# Patient Record
Sex: FEMALE | Race: WHITE | URBAN - METROPOLITAN AREA
[De-identification: names, ages, dates, MRNs, and addresses within clinical notes are randomized per-mention and may not be internally consistent; named-entity substitution may affect disease eponyms.]

---

## 2019-12-11 ENCOUNTER — OFFICE VISIT (OUTPATIENT)
Dept: FAMILY MEDICINE CLINIC | Age: 69
End: 2019-12-11
Payer: MEDICARE

## 2019-12-11 VITALS
HEART RATE: 59 BPM | TEMPERATURE: 98.1 F | OXYGEN SATURATION: 96 % | HEIGHT: 66 IN | BODY MASS INDEX: 29.57 KG/M2 | DIASTOLIC BLOOD PRESSURE: 76 MMHG | RESPIRATION RATE: 16 BRPM | SYSTOLIC BLOOD PRESSURE: 118 MMHG | WEIGHT: 184 LBS

## 2019-12-11 DIAGNOSIS — S61.217A LACERATION OF LEFT LITTLE FINGER WITHOUT FOREIGN BODY WITHOUT DAMAGE TO NAIL, INITIAL ENCOUNTER: Primary | ICD-10-CM

## 2019-12-11 PROCEDURE — 99214 OFFICE O/P EST MOD 30 MIN: CPT | Performed by: PHYSICIAN ASSISTANT

## 2019-12-11 PROCEDURE — G8484 FLU IMMUNIZE NO ADMIN: HCPCS | Performed by: PHYSICIAN ASSISTANT

## 2019-12-11 PROCEDURE — G8400 PT W/DXA NO RESULTS DOC: HCPCS | Performed by: PHYSICIAN ASSISTANT

## 2019-12-11 PROCEDURE — G8419 CALC BMI OUT NRM PARAM NOF/U: HCPCS | Performed by: PHYSICIAN ASSISTANT

## 2019-12-11 PROCEDURE — 12002 RPR S/N/AX/GEN/TRNK2.6-7.5CM: CPT | Performed by: PHYSICIAN ASSISTANT

## 2019-12-11 PROCEDURE — 3017F COLORECTAL CA SCREEN DOC REV: CPT | Performed by: PHYSICIAN ASSISTANT

## 2019-12-11 PROCEDURE — 1036F TOBACCO NON-USER: CPT | Performed by: PHYSICIAN ASSISTANT

## 2019-12-11 PROCEDURE — G8427 DOCREV CUR MEDS BY ELIG CLIN: HCPCS | Performed by: PHYSICIAN ASSISTANT

## 2019-12-11 PROCEDURE — 1123F ACP DISCUSS/DSCN MKR DOCD: CPT | Performed by: PHYSICIAN ASSISTANT

## 2019-12-11 PROCEDURE — 1090F PRES/ABSN URINE INCON ASSESS: CPT | Performed by: PHYSICIAN ASSISTANT

## 2019-12-11 PROCEDURE — 29130 APPL FINGER SPLINT STATIC: CPT | Performed by: PHYSICIAN ASSISTANT

## 2019-12-11 PROCEDURE — 4040F PNEUMOC VAC/ADMIN/RCVD: CPT | Performed by: PHYSICIAN ASSISTANT

## 2019-12-11 RX ORDER — VENLAFAXINE 75 MG/1
75 TABLET ORAL DAILY
COMMUNITY

## 2019-12-11 RX ORDER — SITAGLIPTIN AND METFORMIN HYDROCHLORIDE 1000; 50 MG/1; MG/1
TABLET, FILM COATED ORAL
COMMUNITY
Start: 2019-11-11

## 2019-12-11 RX ORDER — ATORVASTATIN CALCIUM 20 MG/1
TABLET, FILM COATED ORAL
COMMUNITY
Start: 2019-11-11

## 2019-12-11 RX ORDER — INSULIN DEGLUDEC 200 U/ML
INJECTION, SOLUTION SUBCUTANEOUS
Refills: 2 | COMMUNITY
Start: 2019-10-31

## 2019-12-11 RX ORDER — PROPAFENONE HYDROCHLORIDE 150 MG/1
150 TABLET, FILM COATED ORAL EVERY 8 HOURS
COMMUNITY

## 2019-12-11 RX ORDER — AMLODIPINE BESYLATE 10 MG/1
10 TABLET ORAL DAILY
COMMUNITY

## 2019-12-11 RX ORDER — ATENOLOL 50 MG/1
TABLET ORAL
COMMUNITY
Start: 2019-11-11

## 2019-12-11 RX ORDER — INSULIN ASPART 100 [IU]/ML
INJECTION, SOLUTION INTRAVENOUS; SUBCUTANEOUS
Refills: 0 | COMMUNITY
Start: 2019-11-12

## 2019-12-11 SDOH — HEALTH STABILITY: MENTAL HEALTH: HOW OFTEN DO YOU HAVE A DRINK CONTAINING ALCOHOL?: NEVER

## 2019-12-20 ENCOUNTER — OFFICE VISIT (OUTPATIENT)
Dept: FAMILY MEDICINE CLINIC | Age: 69
End: 2019-12-20
Payer: MEDICARE

## 2019-12-20 VITALS
HEIGHT: 66 IN | WEIGHT: 185 LBS | OXYGEN SATURATION: 97 % | SYSTOLIC BLOOD PRESSURE: 138 MMHG | RESPIRATION RATE: 16 BRPM | HEART RATE: 64 BPM | DIASTOLIC BLOOD PRESSURE: 68 MMHG | BODY MASS INDEX: 29.73 KG/M2 | TEMPERATURE: 97.8 F

## 2019-12-20 DIAGNOSIS — Z48.02 ENCOUNTER FOR REMOVAL OF SUTURES: Primary | ICD-10-CM

## 2019-12-20 DIAGNOSIS — S61.217A LACERATION OF LEFT LITTLE FINGER WITHOUT FOREIGN BODY WITHOUT DAMAGE TO NAIL, INITIAL ENCOUNTER: ICD-10-CM

## 2019-12-20 PROCEDURE — 1090F PRES/ABSN URINE INCON ASSESS: CPT | Performed by: PHYSICIAN ASSISTANT

## 2019-12-20 PROCEDURE — G8400 PT W/DXA NO RESULTS DOC: HCPCS | Performed by: PHYSICIAN ASSISTANT

## 2019-12-20 PROCEDURE — 1036F TOBACCO NON-USER: CPT | Performed by: PHYSICIAN ASSISTANT

## 2019-12-20 PROCEDURE — G8484 FLU IMMUNIZE NO ADMIN: HCPCS | Performed by: PHYSICIAN ASSISTANT

## 2019-12-20 PROCEDURE — 1123F ACP DISCUSS/DSCN MKR DOCD: CPT | Performed by: PHYSICIAN ASSISTANT

## 2019-12-20 PROCEDURE — 99213 OFFICE O/P EST LOW 20 MIN: CPT | Performed by: PHYSICIAN ASSISTANT

## 2019-12-20 PROCEDURE — G8427 DOCREV CUR MEDS BY ELIG CLIN: HCPCS | Performed by: PHYSICIAN ASSISTANT

## 2019-12-20 PROCEDURE — G8417 CALC BMI ABV UP PARAM F/U: HCPCS | Performed by: PHYSICIAN ASSISTANT

## 2019-12-20 PROCEDURE — 3017F COLORECTAL CA SCREEN DOC REV: CPT | Performed by: PHYSICIAN ASSISTANT

## 2019-12-20 PROCEDURE — 4040F PNEUMOC VAC/ADMIN/RCVD: CPT | Performed by: PHYSICIAN ASSISTANT

## 2022-11-09 ENCOUNTER — HOSPITAL ENCOUNTER (OUTPATIENT)
Dept: DIABETES SERVICES | Age: 72
Setting detail: THERAPIES SERIES
Discharge: HOME | End: 2022-11-09
Payer: MEDICARE

## 2022-11-09 PROCEDURE — 97804 MEDICAL NUTRITION GROUP: CPT

## 2022-11-09 NOTE — PROGRESS NOTES
MNT Note for Diabetes Education    Date: 11/9/2022  Patient Name: Tasha Hamilton  Referring Clinician: Chantelle Villalta MD    Reason for Visit:   History of attending DSMES: [] Yes  [x] No  Date:  History of MNT: [] Yes  [x] No   Date:    NUTRITION ASSESSMENT:      Sex: female    Age: 71 y.o. Height: 66 inches    CBW: 186 lbs  BMI: 29.9        Current diet  Diet Recall:  B: cereal, milk, fruit  S:  L: sandwich, apple  S:  D: protein, potato   S: fruit     Her diet contains adequate amounts of protein, adequate amounts of healthy fats, inadequate amounts of green, leafy vegetables, and adequate amounts of fruits. Eating food from outside the home:  1 times per week    Weight History:  []Increased in past yr   []Decreased in past yr   [x]Stable    Appetite:  normal    Digestive concerns:  None    Fluid Intake: >64 oz/day  States they drink mainly: water, diet soda    Medications:  Prior to Admission medications    Medication Sig Start Date End Date Taking? Authorizing Provider   rivaroxaban (XARELTO) 10 MG TABS tablet Take 10 mg by mouth daily (with breakfast)    Historical Provider, MD   atenolol (TENORMIN) 50 MG tablet  11/11/19   Historical Provider, MD   atorvastatin (LIPITOR) 20 MG tablet  11/11/19   Historical Provider, MD   JANUMEFRANKO  MG per tablet  11/11/19   Historical Provider, MD WIN FLEXTOUCH 200 UNIT/ML SOPN INJECT 24 UNITS EVERY DAY SUBCUTANEOUS 10/31/19   Historical Provider, MD   NOVOLOG FLEXPEN 100 UNIT/ML injection pen INJECT 15 UNITS INTO THE SKIN 3 TIMES A DAY 11/12/19   Historical Provider, MD   propafenone (RYTHMOL) 150 MG tablet Take 150 mg by mouth every 8 hours    Historical Provider, MD   venlafaxine (EFFEXOR) 75 MG tablet Take 75 mg by mouth daily    Historical Provider, MD   amLODIPine (NORVASC) 10 MG tablet Take 10 mg by mouth daily    Historical Provider, MD             What was your last A1C result?  9.3 %  (4/27/22)     A1C goal:<7.0%  Other:     Diabetes Understanding self-assessment:        [x] Good        [] Fair        [] Poor    Self-Monitoring Blood Glucose: [x] Yes - CGM      Motivational Scale:  10     Motivators for lifestyle change:   Grandson    Barriers:    none    Work:  []Full time  []Part time  []In school  [x]Retired  []Unemployed   []Day shift  []Night Shift  []Other:    Financial Concerns:        []Yes -          [x]No    Do you have a Support person in your life? [x]Yes-  - Julian   []No     Current Exercise Pattern:   []Yes   [x]No    Physical Exam:  Overall appearance: Overweight  Comment:  Behavioral risks: None identified    NUTRITION DIAGNOSIS:  Inconsistent carbohydrate intake  RELATED TO lack of prior expose to accurate nutrition related information  AS EVIDENCED BY elevated A1C, report of excess carbohydrate intake at one large meal or snack. INTERVENTIONS AND GOALS:  consistent carbohydrate intake at meals and snacks( eat meals 4 1/2 to 5 hours apart. Eat snacks 2 hours after meals. Keep carbohydrate choices to 3 ( 45 grams) at each meals with 1 carbohydrate choices (15 grams)  for snacks        Implementation/Follow-up plan: Patient will call with any questions or concerns . This RD will follow up with the patient to monitor progress and evaluate outcomes. Follow-up date:    Follow-up notes:       Encounter Type Date Attended Start Time End Time Comments No Show Dates   Initial MNT 11/9/22 CS 1310 1500    Sung Encarnacion    Follow-up MNT                 Electronically signed by: Lilia Dailey RD, SAMANTHA on 11/9/22 at 3:40 PM EST

## 2023-08-11 ENCOUNTER — HOSPITAL ENCOUNTER (OUTPATIENT)
Age: 73
Discharge: HOME | End: 2023-08-11
Payer: MEDICARE

## 2023-08-11 LAB
BUN SERPL-MCNC: 9 MG/DL (ref 6–23)
CREAT SERPL-MCNC: 0.7 MG/DL (ref 0.5–1)
GFR SERPL CREATININE-BSD FRML MDRD: >60 ML/MIN/1.73M2

## 2023-08-11 PROCEDURE — 36415 COLL VENOUS BLD VENIPUNCTURE: CPT

## 2023-08-11 PROCEDURE — 84520 ASSAY OF UREA NITROGEN: CPT

## 2023-08-11 PROCEDURE — 82565 ASSAY OF CREATININE: CPT

## 2024-07-31 ENCOUNTER — HOSPITAL ENCOUNTER (EMERGENCY)
Age: 74
Discharge: HOME | End: 2024-07-31
Attending: EMERGENCY MEDICINE
Payer: MEDICARE

## 2024-07-31 VITALS
OXYGEN SATURATION: 96 % | HEIGHT: 66 IN | SYSTOLIC BLOOD PRESSURE: 158 MMHG | DIASTOLIC BLOOD PRESSURE: 78 MMHG | RESPIRATION RATE: 16 BRPM | BODY MASS INDEX: 29.73 KG/M2 | TEMPERATURE: 98.3 F | HEART RATE: 88 BPM | WEIGHT: 185 LBS

## 2024-07-31 DIAGNOSIS — Z23 NEED FOR TETANUS BOOSTER: ICD-10-CM

## 2024-07-31 DIAGNOSIS — S05.01XA ABRASION OF RIGHT CORNEA, INITIAL ENCOUNTER: Primary | ICD-10-CM

## 2024-07-31 PROCEDURE — 90471 IMMUNIZATION ADMIN: CPT | Performed by: NURSE PRACTITIONER

## 2024-07-31 PROCEDURE — 6370000000 HC RX 637 (ALT 250 FOR IP): Performed by: NURSE PRACTITIONER

## 2024-07-31 PROCEDURE — 99284 EMERGENCY DEPT VISIT MOD MDM: CPT

## 2024-07-31 PROCEDURE — 6360000002 HC RX W HCPCS: Performed by: NURSE PRACTITIONER

## 2024-07-31 PROCEDURE — 90714 TD VACC NO PRESV 7 YRS+ IM: CPT | Performed by: NURSE PRACTITIONER

## 2024-07-31 RX ORDER — ERYTHROMYCIN 5 MG/G
OINTMENT OPHTHALMIC EVERY 6 HOURS
Qty: 1 EACH | Refills: 0 | Status: SHIPPED | OUTPATIENT
Start: 2024-07-31 | End: 2024-08-05

## 2024-07-31 RX ORDER — TETRACAINE HYDROCHLORIDE 5 MG/ML
2 SOLUTION OPHTHALMIC ONCE
Status: COMPLETED | OUTPATIENT
Start: 2024-07-31 | End: 2024-07-31

## 2024-07-31 RX ADMIN — TETRACAINE HYDROCHLORIDE 2 DROP: 5 SOLUTION OPHTHALMIC at 19:41

## 2024-07-31 RX ADMIN — FLUORESCEIN SODIUM 1 MG: 1 STRIP OPHTHALMIC at 19:41

## 2024-07-31 RX ADMIN — CLOSTRIDIUM TETANI TOXOID ANTIGEN (FORMALDEHYDE INACTIVATED) AND CORYNEBACTERIUM DIPHTHERIAE TOXOID ANTIGEN (FORMALDEHYDE INACTIVATED) 0.5 ML: 5; 2 INJECTION, SUSPENSION INTRAMUSCULAR at 19:41

## 2024-07-31 ASSESSMENT — PAIN - FUNCTIONAL ASSESSMENT: PAIN_FUNCTIONAL_ASSESSMENT: NONE - DENIES PAIN

## 2024-07-31 ASSESSMENT — VISUAL ACUITY
OD: 20/70
OU: 20/30
OS: 20/30

## 2024-07-31 NOTE — ED PROVIDER NOTES
recognition software. Every effort was made to ensure accuracy; however, inadvertent computerized transcription errors may be present.  END OF ED PROVIDER NOTE       Lanre Wick, TEOFILO - JADEN  07/31/24 0877        ATTENDING PROVIDER SUPERVISORY / ATTESTATION:     Alesia Corrales presented to the emergency department for evaluation of Eye Problem (Right eye blurry and red.  Some drainage.  Started Saturday and has worsened. )    I have reviewed and discussed the case, including pertinent history (medical, surgical, family and social) and exam findings with the Midlevel and the Nurse assigned to Alesia Corrales.  I personally saw the patient and performed a substantive portion of the visit including history, exam, and all aspects of the medical decision making.  I personally saw the patient and made/approve the management plan take responsibility for the patient management    Medications   tetracaine (TETRAVISC) 0.5 % ophthalmic solution 2 drop (2 drops Right Eye Given 7/31/24 1941)   fluorescein ophthalmic strip 1 mg (1 mg Right Eye Given 7/31/24 1941)   tetanus & diphtheria toxoids (adult) 5-2 LFU injection 0.5 mL (0.5 mLs IntraMUSCular Given 7/31/24 1941)       Abnormal Labs Reviewed - No data to display    No orders to display          The nursing notes within the ED encounter have been reviewed by myself       The patient’s available past medical records and past encounters were reviewed.         MDM: Asked to see this patient by DOROTHY, required direct physician involvement secondary to complexity of case,         I, Dr. Hall am the primary provider of record    Medical Decision Making  Risk  Prescription drug management.          I have reviewed my findings and recommendations with Alesia Corrales and members of family present at the time of disposition.       --------------------------------- IMPRESSION AND DISPOSITION ---------------------------------    IMPRESSION  1. Abrasion of right cornea, initial

## 2024-07-31 NOTE — ED NOTES
Department of Emergency Medicine  FIRST PROVIDER TRIAGE NOTE             Independent MLP           7/31/24  6:18 PM EDT    Date of Encounter: 7/31/24   MRN: [id number]      HPI: Alesia Corrales is a 74 y.o. female who presents to the ED for Eye Problem (Right eye blurry and red.  Some drainage.  Started Saturday and has worsened. )       ROS: Negative for cp or sob.    PE: Gen Appearance/Constitutional: alert  HEENT: right eye with erythema ,perrla, yellow drainage     Initial Plan of Care: All treatment areas with department are currently occupied. Plan to order/Initiate the following while awaiting opening in ED: eye exam.  Initiate Treatment-Testing, Proceed toTreatment Area When Bed Available for ED Attending/MLP to Continue Care    Electronically signed by TEOFILO Abernathy CNP   DD: 7/31/24       Cindy Mendoza APRN - CNP  07/31/24 7348

## 2025-03-14 ENCOUNTER — ANESTHESIA EVENT (OUTPATIENT)
Dept: OPERATING ROOM | Age: 75
End: 2025-03-14
Payer: MEDICARE

## 2025-03-18 ASSESSMENT — LIFESTYLE VARIABLES: SMOKING_STATUS: 0

## 2025-03-18 NOTE — ANESTHESIA PRE PROCEDURE
Department of Anesthesiology  Preprocedure Note       Name:  Alesia Corrales   Age:  74 y.o.  :  1950                                          MRN:  [id number]         Date:  3/18/2025      Surgeon: Surgeon(s):  Raymundo Fields MD    Procedure: Procedure(s):  LEFT HAND CARPAL TUNNEL RELEASE POSSIBLE MEDIAN NERVE WRAP (CPT 11733)    Medications prior to admission:   Prior to Admission medications    Medication Sig Start Date End Date Taking? Authorizing Provider   rivaroxaban (XARELTO) 10 MG TABS tablet Take 1 tablet by mouth daily (with breakfast) Follow Dr. Berger anticoagulation order   Yes Lynsey Owusu MD   atorvastatin (LIPITOR) 20 MG tablet every evening 19  Yes Lynsey Owusu MD   JANUMET  MG per tablet 2 times daily (with meals) 19  Yes Lynsey Owusu MD TRESIBA FLEXTOUCH 200 UNIT/ML SOPN at bedtime Decrease evening insulin by 1/2 dosage pre-op only 10/31/19  Yes Lynsey Owusu MD   propafenone (RYTHMOL) 150 MG tablet Take 1 tablet by mouth in the morning and 1 tablet at noon and 1 tablet in the evening.   Yes Lynsey Owusu MD   venlafaxine (EFFEXOR) 75 MG tablet Take 1 tablet by mouth daily   Yes Lynsey Owusu MD   atenolol (TENORMIN) 50 MG tablet daily am 19   Lynsey Owusu MD   NOVOLOG FLEXPEN 100 UNIT/ML injection pen 3 times daily (before meals) Dose is per sliding scale. 19   Lynsey Owusu MD   amLODIPine (NORVASC) 10 MG tablet Take 1 tablet by mouth daily am    Lynsey Owusu MD       Current medications:    No current facility-administered medications for this encounter.     Current Outpatient Medications   Medication Sig Dispense Refill    rivaroxaban (XARELTO) 10 MG TABS tablet Take 1 tablet by mouth daily (with breakfast) Follow Dr. Berger anticoagulation order      atorvastatin (LIPITOR) 20 MG tablet every evening      JANUMET  MG per tablet 2 times daily (with meals)      TRESIBA

## 2025-03-18 NOTE — H&P
History and Physical    Patient's Name/Date of Birth: Alesia Corrales / 1950 (74 y.o.)    Date: March 17, 2025     Chief Complaint: left hand paraesthesias    HPI:    This is a 74 years of age, right-hand-dominant, white female, who used to be a working colleague of mine, as a registered nurse at [employer].  We have done many surgeries together over several years.  Patient did have a carpal tunnel release, per different surgeon 20+ years ago.  She now has carpal tunnel like symptoms returning on the left hand that are fairly moderate.  She describes numbness, tingling, palmar discomfort.  This is causing frequent awakenings at night.  She feels as if she is failed conservative management with behavior modification, decreased activity, and anti-inflammatories as needed.  I have reviewed the different treatment options, during the evaluation at our [facility] office in [place], [place].  Patient is opting for surgery, which I think is reasonable.  When we have a re-do surgery we do consider a nerve wrap which I have explained to the patient.  We did obtain verbal/written informed consent, the patient was in the office.  All questions were answered at that time.    Past Medical History:   Diagnosis Date    Atrial fibrillation (HCC)     Hyperlipidemia     Hypertension     IDDM     Sleep apnea     DX 2/2025- waiting for a mask       Past Surgical History:   Procedure Laterality Date    EYE SURGERY Bilateral     cataracts    TONSILLECTOMY      WRIST SURGERY Bilateral     1990's bilateral       Prior to Admission medications    Medication Sig Start Date End Date Taking? Authorizing Provider   rivaroxaban (XARELTO) 10 MG TABS tablet Take 1 tablet by mouth daily (with breakfast) Follow Dr. Berger anticoagulation order   Yes Provider, MD Lynsey   atorvastatin (LIPITOR) 20 MG tablet every evening 11/11/19  Yes ProviderLynsey MD JANUMET  MG per tablet 2 times daily (with meals)

## 2025-03-19 ENCOUNTER — HOSPITAL ENCOUNTER (OUTPATIENT)
Age: 75
Setting detail: OUTPATIENT SURGERY
Discharge: HOME | End: 2025-03-19
Attending: SURGERY | Admitting: SURGERY
Payer: MEDICARE

## 2025-03-19 ENCOUNTER — ANESTHESIA (OUTPATIENT)
Dept: OPERATING ROOM | Age: 75
End: 2025-03-19
Payer: MEDICARE

## 2025-03-19 VITALS
TEMPERATURE: 98.3 F | BODY MASS INDEX: 30.86 KG/M2 | WEIGHT: 192 LBS | SYSTOLIC BLOOD PRESSURE: 115 MMHG | DIASTOLIC BLOOD PRESSURE: 60 MMHG | HEIGHT: 66 IN | HEART RATE: 50 BPM | OXYGEN SATURATION: 96 % | RESPIRATION RATE: 16 BRPM

## 2025-03-19 DIAGNOSIS — G56.02 CARPAL TUNNEL SYNDROME ON LEFT: Primary | ICD-10-CM

## 2025-03-19 PROCEDURE — 3600000003 HC SURGERY LEVEL 3 BASE: Performed by: SURGERY

## 2025-03-19 PROCEDURE — 2709999900 HC NON-CHARGEABLE SUPPLY: Performed by: SURGERY

## 2025-03-19 PROCEDURE — 6360000002 HC RX W HCPCS: Performed by: NURSE ANESTHETIST, CERTIFIED REGISTERED

## 2025-03-19 PROCEDURE — 7100000010 HC PHASE II RECOVERY - FIRST 15 MIN: Performed by: SURGERY

## 2025-03-19 PROCEDURE — 2580000003 HC RX 258: Performed by: ANESTHESIOLOGY

## 2025-03-19 PROCEDURE — 7100000011 HC PHASE II RECOVERY - ADDTL 15 MIN: Performed by: SURGERY

## 2025-03-19 PROCEDURE — 2500000003 HC RX 250 WO HCPCS: Performed by: SURGERY

## 2025-03-19 PROCEDURE — 3700000000 HC ANESTHESIA ATTENDED CARE: Performed by: SURGERY

## 2025-03-19 PROCEDURE — 82962 GLUCOSE BLOOD TEST: CPT | Performed by: SURGERY

## 2025-03-19 PROCEDURE — 6360000002 HC RX W HCPCS: Performed by: SURGERY

## 2025-03-19 PROCEDURE — 2500000003 HC RX 250 WO HCPCS: Performed by: NURSE ANESTHETIST, CERTIFIED REGISTERED

## 2025-03-19 PROCEDURE — 3700000001 HC ADD 15 MINUTES (ANESTHESIA): Performed by: SURGERY

## 2025-03-19 PROCEDURE — 3600000013 HC SURGERY LEVEL 3 ADDTL 15MIN: Performed by: SURGERY

## 2025-03-19 DEVICE — ALLOGRAFT ARTHROFLEX DECELLURIZ W/MATRACELL 20X25MM: Type: IMPLANTABLE DEVICE | Site: WRIST | Status: FUNCTIONAL

## 2025-03-19 RX ORDER — MEPERIDINE HYDROCHLORIDE 25 MG/ML
12.5 INJECTION INTRAMUSCULAR; INTRAVENOUS; SUBCUTANEOUS ONCE
Status: DISCONTINUED | OUTPATIENT
Start: 2025-03-19 | End: 2025-03-19 | Stop reason: HOSPADM

## 2025-03-19 RX ORDER — NALOXONE HYDROCHLORIDE 0.4 MG/ML
INJECTION, SOLUTION INTRAMUSCULAR; INTRAVENOUS; SUBCUTANEOUS PRN
Status: DISCONTINUED | OUTPATIENT
Start: 2025-03-19 | End: 2025-03-19 | Stop reason: HOSPADM

## 2025-03-19 RX ORDER — CEPHALEXIN 500 MG/1
500 CAPSULE ORAL 3 TIMES DAILY
Qty: 16 CAPSULE | Refills: 0 | Status: SHIPPED | OUTPATIENT
Start: 2025-03-19 | End: 2025-03-25

## 2025-03-19 RX ORDER — HYDROCODONE BITARTRATE AND ACETAMINOPHEN 5; 325 MG/1; MG/1
1 TABLET ORAL EVERY 4 HOURS PRN
Qty: 9 TABLET | Refills: 0 | Status: SHIPPED | OUTPATIENT
Start: 2025-03-19 | End: 2025-03-22

## 2025-03-19 RX ORDER — DIPHENHYDRAMINE HYDROCHLORIDE 50 MG/ML
12.5 INJECTION, SOLUTION INTRAMUSCULAR; INTRAVENOUS
Status: DISCONTINUED | OUTPATIENT
Start: 2025-03-19 | End: 2025-03-19 | Stop reason: HOSPADM

## 2025-03-19 RX ORDER — DROPERIDOL 2.5 MG/ML
0.62 INJECTION, SOLUTION INTRAMUSCULAR; INTRAVENOUS
Status: DISCONTINUED | OUTPATIENT
Start: 2025-03-19 | End: 2025-03-19 | Stop reason: HOSPADM

## 2025-03-19 RX ORDER — OXYCODONE AND ACETAMINOPHEN 5; 325 MG/1; MG/1
1 TABLET ORAL EVERY 4 HOURS PRN
Refills: 0 | Status: DISCONTINUED | OUTPATIENT
Start: 2025-03-19 | End: 2025-03-19 | Stop reason: HOSPADM

## 2025-03-19 RX ORDER — EPHEDRINE SULFATE/0.9% NACL/PF 25 MG/5 ML
SYRINGE (ML) INTRAVENOUS
Status: DISCONTINUED | OUTPATIENT
Start: 2025-03-19 | End: 2025-03-19 | Stop reason: SDUPTHER

## 2025-03-19 RX ORDER — ONDANSETRON 2 MG/ML
INJECTION INTRAMUSCULAR; INTRAVENOUS
Status: DISCONTINUED | OUTPATIENT
Start: 2025-03-19 | End: 2025-03-19 | Stop reason: SDUPTHER

## 2025-03-19 RX ORDER — MIDAZOLAM HYDROCHLORIDE 1 MG/ML
INJECTION, SOLUTION INTRAMUSCULAR; INTRAVENOUS
Status: DISCONTINUED | OUTPATIENT
Start: 2025-03-19 | End: 2025-03-19 | Stop reason: SDUPTHER

## 2025-03-19 RX ORDER — MORPHINE SULFATE 2 MG/ML
1 INJECTION, SOLUTION INTRAMUSCULAR; INTRAVENOUS EVERY 5 MIN PRN
Status: DISCONTINUED | OUTPATIENT
Start: 2025-03-19 | End: 2025-03-19 | Stop reason: HOSPADM

## 2025-03-19 RX ORDER — SODIUM CHLORIDE 0.9 % (FLUSH) 0.9 %
5-40 SYRINGE (ML) INJECTION EVERY 12 HOURS SCHEDULED
Status: DISCONTINUED | OUTPATIENT
Start: 2025-03-19 | End: 2025-03-19 | Stop reason: HOSPADM

## 2025-03-19 RX ORDER — SODIUM CHLORIDE, SODIUM LACTATE, POTASSIUM CHLORIDE, CALCIUM CHLORIDE 600; 310; 30; 20 MG/100ML; MG/100ML; MG/100ML; MG/100ML
INJECTION, SOLUTION INTRAVENOUS CONTINUOUS
Status: DISCONTINUED | OUTPATIENT
Start: 2025-03-19 | End: 2025-03-19 | Stop reason: HOSPADM

## 2025-03-19 RX ORDER — SODIUM CHLORIDE 0.9 % (FLUSH) 0.9 %
5-40 SYRINGE (ML) INJECTION PRN
Status: DISCONTINUED | OUTPATIENT
Start: 2025-03-19 | End: 2025-03-19 | Stop reason: HOSPADM

## 2025-03-19 RX ORDER — FENTANYL CITRATE 50 UG/ML
INJECTION, SOLUTION INTRAMUSCULAR; INTRAVENOUS
Status: DISCONTINUED | OUTPATIENT
Start: 2025-03-19 | End: 2025-03-19 | Stop reason: SDUPTHER

## 2025-03-19 RX ORDER — PROPOFOL 10 MG/ML
INJECTION, EMULSION INTRAVENOUS
Status: DISCONTINUED | OUTPATIENT
Start: 2025-03-19 | End: 2025-03-19 | Stop reason: SDUPTHER

## 2025-03-19 RX ORDER — SODIUM CHLORIDE 9 MG/ML
INJECTION, SOLUTION INTRAVENOUS PRN
Status: DISCONTINUED | OUTPATIENT
Start: 2025-03-19 | End: 2025-03-19 | Stop reason: HOSPADM

## 2025-03-19 RX ORDER — HYDROCODONE BITARTRATE AND ACETAMINOPHEN 5; 325 MG/1; MG/1
1 TABLET ORAL EVERY 6 HOURS PRN
Status: DISCONTINUED | OUTPATIENT
Start: 2025-03-19 | End: 2025-03-19 | Stop reason: HOSPADM

## 2025-03-19 RX ORDER — FENTANYL CITRATE 0.05 MG/ML
50 INJECTION, SOLUTION INTRAMUSCULAR; INTRAVENOUS EVERY 5 MIN PRN
Status: DISCONTINUED | OUTPATIENT
Start: 2025-03-19 | End: 2025-03-19 | Stop reason: HOSPADM

## 2025-03-19 RX ADMIN — ONDANSETRON 4 MG: 2 INJECTION INTRAMUSCULAR; INTRAVENOUS at 08:56

## 2025-03-19 RX ADMIN — MIDAZOLAM 2 MG: 1 INJECTION INTRAMUSCULAR; INTRAVENOUS at 08:52

## 2025-03-19 RX ADMIN — PROPOFOL 50 MCG/KG/MIN: 10 INJECTION, EMULSION INTRAVENOUS at 08:56

## 2025-03-19 RX ADMIN — FENTANYL CITRATE 50 MCG: 50 INJECTION, SOLUTION INTRAMUSCULAR; INTRAVENOUS at 08:56

## 2025-03-19 RX ADMIN — SODIUM CHLORIDE, POTASSIUM CHLORIDE, SODIUM LACTATE AND CALCIUM CHLORIDE: 600; 310; 30; 20 INJECTION, SOLUTION INTRAVENOUS at 08:24

## 2025-03-19 RX ADMIN — EPHEDRINE SULFATE 5 MG: 5 INJECTION INTRAVENOUS at 09:31

## 2025-03-19 RX ADMIN — FENTANYL CITRATE 25 MCG: 50 INJECTION, SOLUTION INTRAMUSCULAR; INTRAVENOUS at 09:47

## 2025-03-19 RX ADMIN — EPHEDRINE SULFATE 5 MG: 5 INJECTION INTRAVENOUS at 09:19

## 2025-03-19 RX ADMIN — SODIUM CHLORIDE, POTASSIUM CHLORIDE, SODIUM LACTATE AND CALCIUM CHLORIDE: 600; 310; 30; 20 INJECTION, SOLUTION INTRAVENOUS at 09:47

## 2025-03-19 ASSESSMENT — PAIN DESCRIPTION - DESCRIPTORS: DESCRIPTORS: NUMBNESS

## 2025-03-19 ASSESSMENT — PAIN - FUNCTIONAL ASSESSMENT
PAIN_FUNCTIONAL_ASSESSMENT: 0-10

## 2025-03-19 NOTE — OP NOTE
then transitioned to dull Shoshana retractors, in combination with longitudinal, sharp dissection with a # 15 blade scalpel. I dissected down through the Superficial Palmar fascia, continuing through the Palmaris brevis muscle, and finally through the Transverse carpal ligament. Once into the Carpal tunnel, I then dissected from the mid wound distally, with the distal extent of the dissection being identification of the midpalmar adipose tissue, at the level of the Superficial Palmar arch and common digital nerve branches. All points of decompression were done under direct visualization.    I then went back to the midportion of the wound, and dissected from the midwound proximally, with the proximal extent of the dissection, being the proximal edge of the flexor retinaculum. The pathologic findings included severe flattening compression in the  mid  portions of the Carpal Tunnel, medium abnormal red discoloration, mild amount of intrinsic vasculature dilatation, moderate capillary injection (signifying chronic inflammatory changes), moderate amount of perineural inflammatory hypertrophy, severe amount of inflammatory adhesions of the Median nerve to the radial lateral sidewalls.    I then encircled the Median nerve bluntly with a right-angle dissector. I then encircled the Median nerve with a saline- soaked, quarter-inch Penrose drain. The drain was used for gentle radial and ulnar retraction of the Median nerve to gain access to the deeper Carpal Tunnel contents. I then provided a moderate amount of underlying finger flexor tenosynovectomies. This maneuver was done sharply with the Metzenbaum scissors. I then copiously irrigated the wound out with refrigerated normal saline. I did remove the drain and passively extend the digits, realigning the Carpal Tunnel anatomy.     The next procedure was the nerve wrap.  I did use the Terry flex graft 2 x 2.5 cm, which did slide around the median nerve and then fit perfectly.  I  did not have to trim the graft.  I did sew this into place with 5-0 PDS clear suture, since the purple variety was not available.  This was done with interrupted simple sutures on the proximal and distal ends taking a small amount of perineurium, with very special attention not to suture the nerve fascicles.  There is no sliding of the graft and the 2.5 cm area that was involved, was all that was needed.  This is done because this was a re-do surgery to prevent rereoccurrence.  There was severe adherence of the radial lateral sidewall.  There is also a higher likelihood of the median nerve sticking to the overlying or more palmar healing tissues. I then closed the wound in one layer using interrupted vertical mattress 4-0 nylon suture.    At the conclusion of the case, the sponge, instrument, and needle counts were correct x2.  The estimated blood loss was 3 mL.  The extremity was wiped clean of Betadine and blood, and my standard hand and wrist soft dressing was then applied. The patient tolerated the operation well, was sent to the postanesthesia care unit in stable fair condition, escorted by myself, RN, and CRNA.    Electronically signed by Raymundo Fields MD on 3/19/2025 at 10:24 AM

## 2025-03-19 NOTE — ANESTHESIA POSTPROCEDURE EVALUATION
Department of Anesthesiology  Postprocedure Note    Patient: Alesia Corrales  MRN: [id number]  YOB: 1950  Date of evaluation: 3/19/2025    Procedure Summary       Date: 03/19/25 Room / Location: [address]    Anesthesia Start: 0852 Anesthesia Stop: 1013    Procedure: LEFT HAND CARPAL TUNNEL RELEASE POSSIBLE MEDIAN NERVE WRAP (CPT 21283) (Left: Wrist) Diagnosis:       Carpal tunnel syndrome on left      (Carpal tunnel syndrome on left [G56.02])    Surgeons: Raymundo Fields MD Responsible Provider: Devendra Roldan MD    Anesthesia Type: MAC ASA Status: 3            Anesthesia Type: MAC    Lauren Phase I: Lauren Score: 10    Lauren Phase II: Lauren Score: 10    Anesthesia Post Evaluation    Patient location during evaluation: PACU  Patient participation: complete - patient participated  Level of consciousness: awake and alert  Airway patency: patent  Nausea & Vomiting: no nausea and no vomiting  Cardiovascular status: hemodynamically stable  Respiratory status: room air and spontaneous ventilation  Hydration status: stable  Pain management: satisfactory to patient    No notable events documented.

## 2025-03-19 NOTE — DISCHARGE INSTRUCTIONS
Raymundo Fields M.D.  Reconstructive Microsurgery      Elevate your operative hand above your heart level for one week.  2. Do NOT change your dressing.  3. Okay to shower or bathe with plastic bag over \"boxing glove\" dressing.  4. Follow up with Dr. Fields at [facility],  on date 3/25/25.  5. Tuesday at 09:30 a.  6. Patient will be excused from work or school until after first post operative re-check.  7. No driving unless off pain medication for 24 hrs., and only allowed to drive with the unoperated hand.  8. Dr. Fields does not have the Medical/Dental bureau service. If you need to reach Dr. Fields after hours,        please call one of the [facility] hospitals:    **  Please tell the , You recently had surgery w/ Dr. Fields, and have him paged via his cell phone.       Please make sure they do not connect to Office, since no one is there after hours.    [facility]   [phone]     [facility]    [phone]    [facility] [phone]                   [place]: [phone] or [facility] at [phone] and they will page him.    [facility]  Tamiko Fields M.D.                                   Raymundo Fields M.D.  [address] [facility] [address]                            [address] [place] [address]                          [phone]                 Nausea and Vomiting After Surgery: Care Instructions  Your Care Instructions     After you've had surgery, you may feel sick to your stomach (nauseated) or you may vomit. Sometimes anesthesia can make you feel sick. It's a common side effect and often doesn't last long. Pain also can make you feel sick or vomit. After the anesthesia wears off, you may feel pain from the incision (cut). That pain could

## (undated) DEVICE — PREP TRAY 10X5X2: Brand: MEDLINE INDUSTRIES, INC.

## (undated) DEVICE — GOWN SIRUS NONREIN XL W/TWL: Brand: MEDLINE INDUSTRIES, INC.

## (undated) DEVICE — CORD ES L12FT BPLR FRCP

## (undated) DEVICE — SUTURE PDS II SZ 5-0 L18IN ABSRB UD P-3 L13MM 3/8 CIR PRIM Z493G

## (undated) DEVICE — 20 ML SYRINGE REGULAR TIP: Brand: MONOJECT

## (undated) DEVICE — NEPTUNE E-SEP SMOKE EVACUATION PENCIL, COATED, 70MM BLADE, PUSH BUTTON SWITCH: Brand: NEPTUNE E-SEP

## (undated) DEVICE — COUNTER NDL 10 COUNT HLD 20 FOAM BLK SGL MAG

## (undated) DEVICE — BLADE,STAINLESS-STEEL,15,STRL,DISPOSABLE: Brand: MEDLINE

## (undated) DEVICE — DRESSING PETRO W3XL3IN OIL EMUL N ADH GZ KNIT IMPREG CELOS

## (undated) DEVICE — GLOVE ORANGE PI 8 1/2   MSG9085

## (undated) DEVICE — 4-PORT MANIFOLD: Brand: NEPTUNE 2

## (undated) DEVICE — NEEDLE HYPO 25GA L1.5IN BLU POLYPR HUB S STL REG BVL STR

## (undated) DEVICE — STOCKINETTE ORTHOPEDIC DBL PLY 54X4 IN STRL LF

## (undated) DEVICE — STOCKINETTE,DOUBLE PLY,6X54,STERILE: Brand: MEDLINE

## (undated) DEVICE — TOWEL,OR,DSP,ST,BLUE,STD,6/PK,12PK/CS: Brand: MEDLINE

## (undated) DEVICE — BANDAGE COMPR W4INXL5YD WHT BGE POLY COT M E WRP WV HK AND

## (undated) DEVICE — BASIC PACK: Brand: CONVERTORS

## (undated) DEVICE — TIBURON EXTREMITY SHEET: Brand: CONVERTORS

## (undated) DEVICE — TRAY,SKIN SCRUB,DRY,W/GAUZE: Brand: MEDLINE

## (undated) DEVICE — SOLUTION IRRIG 1000ML 0.9% SOD CHL USP POUR PLAS BTL

## (undated) DEVICE — GAUZE,SPONGE,4"X4",16PLY,XRAY,STRL,LF: Brand: MEDLINE

## (undated) DEVICE — MARKER,SKIN,WI/RULER AND LABELS: Brand: MEDLINE

## (undated) DEVICE — NEEDLE HYPO 18GA L1.5IN PNK POLYPR HUB S STL REG BVL STR

## (undated) DEVICE — SOLUTION PREP PAINT POV IOD FOR SKIN MUCOUS MEM

## (undated) DEVICE — BANDAGE,GAUZE,BULKEE II,4.5"X4.1YD,STRL: Brand: MEDLINE

## (undated) DEVICE — ELECTRODE ES AD PED L2.5IN TEF INSUL MOD NONCORDED BLDE TIP

## (undated) DEVICE — BLADE OPHTH GRN ROUNDED TIP 1 SIDE SHRP GRINDLESS MINI-BLDE

## (undated) DEVICE — CONTROL SYRINGE LUER-LOCK TIP: Brand: MONOJECT

## (undated) DEVICE — SUTURE MONOCRYL SZ 4-0 L18IN ABSRB UD P-3 L13MM 3/8 CIR PRIM Y494G

## (undated) DEVICE — SUTURE N ABSRB L 18 IN SZ 4-0 NDL L 19 MM NYL MONOFILAMENT

## (undated) DEVICE — COVER,LIGHT HANDLE,FLX,2/PK: Brand: MEDLINE INDUSTRIES, INC.